# Patient Record
Sex: FEMALE | Race: WHITE | NOT HISPANIC OR LATINO | Employment: PART TIME | URBAN - METROPOLITAN AREA
[De-identification: names, ages, dates, MRNs, and addresses within clinical notes are randomized per-mention and may not be internally consistent; named-entity substitution may affect disease eponyms.]

---
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
## 2021-01-22 ENCOUNTER — TRANSFERRED RECORDS (OUTPATIENT)
Dept: HEALTH INFORMATION MANAGEMENT | Facility: CLINIC | Age: 21
End: 2021-01-22

## 2023-04-24 ASSESSMENT — PATIENT HEALTH QUESTIONNAIRE - PHQ9
10. IF YOU CHECKED OFF ANY PROBLEMS, HOW DIFFICULT HAVE THESE PROBLEMS MADE IT FOR YOU TO DO YOUR WORK, TAKE CARE OF THINGS AT HOME, OR GET ALONG WITH OTHER PEOPLE: NOT DIFFICULT AT ALL
SUM OF ALL RESPONSES TO PHQ QUESTIONS 1-9: 10
SUM OF ALL RESPONSES TO PHQ QUESTIONS 1-9: 10

## 2023-04-24 ASSESSMENT — ENCOUNTER SYMPTOMS
PALPITATIONS: 0
FREQUENCY: 0
ARTHRALGIAS: 0
MYALGIAS: 0
HEMATOCHEZIA: 0
PARESTHESIAS: 0
FEVER: 0
HEADACHES: 1
CHILLS: 0
EYE PAIN: 0
NERVOUS/ANXIOUS: 1
ABDOMINAL PAIN: 0
SHORTNESS OF BREATH: 0
NAUSEA: 0
HEARTBURN: 0
BREAST MASS: 0
SORE THROAT: 0
CONSTIPATION: 0
DYSURIA: 0
DIZZINESS: 0
WEAKNESS: 0
HEMATURIA: 0
DIARRHEA: 0
JOINT SWELLING: 0
COUGH: 0

## 2023-04-27 ENCOUNTER — OFFICE VISIT (OUTPATIENT)
Dept: FAMILY MEDICINE | Facility: CLINIC | Age: 23
End: 2023-04-27
Payer: COMMERCIAL

## 2023-04-27 VITALS
SYSTOLIC BLOOD PRESSURE: 118 MMHG | TEMPERATURE: 98.2 F | OXYGEN SATURATION: 98 % | BODY MASS INDEX: 23.95 KG/M2 | HEART RATE: 75 BPM | RESPIRATION RATE: 16 BRPM | WEIGHT: 158 LBS | HEIGHT: 68 IN | DIASTOLIC BLOOD PRESSURE: 76 MMHG

## 2023-04-27 DIAGNOSIS — Z12.4 CERVICAL CANCER SCREENING: ICD-10-CM

## 2023-04-27 DIAGNOSIS — L20.82 FLEXURAL ECZEMA: ICD-10-CM

## 2023-04-27 DIAGNOSIS — Z00.00 ROUTINE GENERAL MEDICAL EXAMINATION AT A HEALTH CARE FACILITY: Primary | ICD-10-CM

## 2023-04-27 DIAGNOSIS — F41.9 ANXIETY: ICD-10-CM

## 2023-04-27 DIAGNOSIS — R41.840 DIFFICULTY CONCENTRATING: ICD-10-CM

## 2023-04-27 PROBLEM — S06.0X0A CONCUSSION WITHOUT LOSS OF CONSCIOUSNESS: Status: ACTIVE | Noted: 2023-04-27

## 2023-04-27 PROBLEM — F33.0 MILD RECURRENT MAJOR DEPRESSION (H): Status: ACTIVE | Noted: 2023-04-27

## 2023-04-27 PROCEDURE — 90715 TDAP VACCINE 7 YRS/> IM: CPT | Performed by: PHYSICIAN ASSISTANT

## 2023-04-27 PROCEDURE — 99213 OFFICE O/P EST LOW 20 MIN: CPT | Mod: 25 | Performed by: PHYSICIAN ASSISTANT

## 2023-04-27 PROCEDURE — 90471 IMMUNIZATION ADMIN: CPT | Performed by: PHYSICIAN ASSISTANT

## 2023-04-27 PROCEDURE — G0145 SCR C/V CYTO,THINLAYER,RESCR: HCPCS | Performed by: PHYSICIAN ASSISTANT

## 2023-04-27 PROCEDURE — 99385 PREV VISIT NEW AGE 18-39: CPT | Mod: 25 | Performed by: PHYSICIAN ASSISTANT

## 2023-04-27 RX ORDER — HYDROXYZINE HYDROCHLORIDE 25 MG/1
TABLET, FILM COATED ORAL
COMMUNITY
Start: 2022-11-14 | End: 2023-04-27 | Stop reason: SINTOL

## 2023-04-27 RX ORDER — SERTRALINE HYDROCHLORIDE 25 MG/1
TABLET, FILM COATED ORAL
COMMUNITY
Start: 2022-11-14 | End: 2023-04-27

## 2023-04-27 ASSESSMENT — ENCOUNTER SYMPTOMS
SHORTNESS OF BREATH: 0
DIARRHEA: 0
MYALGIAS: 0
ARTHRALGIAS: 0
CHILLS: 0
HEMATOCHEZIA: 0
SORE THROAT: 0
BREAST MASS: 0
WEAKNESS: 0
FEVER: 0
HEADACHES: 1
HEARTBURN: 0
JOINT SWELLING: 0
ABDOMINAL PAIN: 0
HEMATURIA: 0
DIZZINESS: 0
NERVOUS/ANXIOUS: 1
FREQUENCY: 0
DYSURIA: 0
CONSTIPATION: 0
PALPITATIONS: 0
COUGH: 0
PARESTHESIAS: 0
EYE PAIN: 0
NAUSEA: 0

## 2023-04-27 ASSESSMENT — ANXIETY QUESTIONNAIRES
IF YOU CHECKED OFF ANY PROBLEMS ON THIS QUESTIONNAIRE, HOW DIFFICULT HAVE THESE PROBLEMS MADE IT FOR YOU TO DO YOUR WORK, TAKE CARE OF THINGS AT HOME, OR GET ALONG WITH OTHER PEOPLE: SOMEWHAT DIFFICULT
2. NOT BEING ABLE TO STOP OR CONTROL WORRYING: MORE THAN HALF THE DAYS
GAD7 TOTAL SCORE: 12
5. BEING SO RESTLESS THAT IT IS HARD TO SIT STILL: MORE THAN HALF THE DAYS
6. BECOMING EASILY ANNOYED OR IRRITABLE: NEARLY EVERY DAY
GAD7 TOTAL SCORE: 12
3. WORRYING TOO MUCH ABOUT DIFFERENT THINGS: SEVERAL DAYS
1. FEELING NERVOUS, ANXIOUS, OR ON EDGE: MORE THAN HALF THE DAYS
7. FEELING AFRAID AS IF SOMETHING AWFUL MIGHT HAPPEN: NOT AT ALL

## 2023-04-27 ASSESSMENT — PATIENT HEALTH QUESTIONNAIRE - PHQ9: 5. POOR APPETITE OR OVEREATING: MORE THAN HALF THE DAYS

## 2023-04-27 NOTE — PATIENT INSTRUCTIONS
Eczema: I recommend using a daily moisturizing cream (such as Cetaphil or CeraVe) at least 4-5 times.     Use hydrocortisone as needed (twice per day for 3-7 days at a time).     Preventive Health Recommendations  Female Ages 21 to 25     Yearly exam:   See your health care provider every year in order to  Review health changes.   Discuss preventive care.    Review your medicines if your doctor has prescribed any.    You should be tested each year for STDs (sexually transmitted diseases).     Talk to your provider about how often you should have cholesterol testing.    Get a Pap test every three years. If you have an abnormal result, your doctor may have you test more often.    If you are at risk for diabetes, you should have a diabetes test (fasting glucose).     Shots:   Get a flu shot each year.   Get a tetanus shot every 10 years.   Consider getting the shot (vaccine) that prevents cervical cancer (Gardasil).    Nutrition:   Eat at least 5 servings of fruits and vegetables each day.  Eat whole-grain bread, whole-wheat pasta and brown rice instead of white grains and rice.  Get adequate Calcium and Vitamin D.     Lifestyle  Exercise at least 150 minutes a week each week (30 minutes a day, 5 days a week). This will help you control your weight and prevent disease.  Limit alcohol to one drink per day.  No smoking.   Wear sunscreen to prevent skin cancer.  See your dentist every six months for an exam and cleaning.

## 2023-04-27 NOTE — PROGRESS NOTES
Prior to immunization administration, verified patients identity using patient s name and date of birth. Please see Immunization Activity for additional information.     Screening Questionnaire for Adult Immunization    Are you sick today?   No   Do you have allergies to medications, food, a vaccine component or latex?   No   Have you ever had a serious reaction after receiving a vaccination?   No   Do you have a long-term health problem with heart, lung, kidney, or metabolic disease (e.g., diabetes), asthma, a blood disorder, no spleen, complement component deficiency, a cochlear implant, or a spinal fluid leak?  Are you on long-term aspirin therapy?   No   Do you have cancer, leukemia, HIV/AIDS, or any other immune system problem?   No   Do you have a parent, brother, or sister with an immune system problem?   No   In the past 3 months, have you taken medications that affect  your immune system, such as prednisone, other steroids, or anticancer drugs; drugs for the treatment of rheumatoid arthritis, Crohn s disease, or psoriasis; or have you had radiation treatments?   No   Have you had a seizure, or a brain or other nervous system problem?   No   During the past year, have you received a transfusion of blood or blood    products, or been given immune (gamma) globulin or antiviral drug?   No   For women: Are you pregnant or is there a chance you could become       pregnant during the next month?   No   Have you received any vaccinations in the past 4 weeks?   No     Immunization questionnaire answers were all negative.      Injection of Tdap given by Jerardo Martínez CMA. Patient instructed to remain in clinic for 15 minutes afterwards, and to report any adverse reactions.     Screening performed by Jerardo Martínez CMA on 4/27/2023 at 11:06 AM.       SUBJECTIVE:   CC: Dustin is an 22 year old who presents for preventive health visit.        View : No data to display.            Patient has been advised of split  billing requirements and indicates understanding: Yes  Healthy Habits:     Getting at least 3 servings of Calcium per day:  NO    Bi-annual eye exam:  NO    Dental care twice a year:  NO    Sleep apnea or symptoms of sleep apnea:  None    Diet:  Regular (no restrictions)    Frequency of exercise:  1 day/week    Duration of exercise:  Less than 15 minutes    Taking medications regularly:  Yes    Medication side effects:  None    PHQ-2 Total Score: 4    Additional concerns today:  Yes    New patient arrived for Annual Physical. Due for PAP. Pt is fasting for lab work. No questions outside of Physical per pt.     She would like further testing for possible ADHD.  Since her Freshman year of college she noticed difficulty with school.   She does notice she will interrupt people during conversations frequently.  She has difficulty sitting still.  Will fidget often.  Keeping concentration is difficulty.   Graduating in a week with a degree in Criminal justice and Psychology.    Works at [employer].      If she takes hydroxyzine, she wakes up the next day with migraine symptoms.   She takes the hydroxyzine to help her sleep as she feels her mind is racing and anxiety levels are high before bed.   A co-worker mentioned trazodone as a different medication she could try.     Sertraline has helped with anxiety and she feels more mellow.  Started this October 2022.         She c/o skin rash in antecubital areas.  Comes out of no where.  No triggers noted.  Tried switching to a dye free detergent.  Will also come on over shins.  Rash is itchy.   Tried sensitive skin lotion. Cortizone 10 does help.                  Today's PHQ-2 Score:       4/24/2023     4:59 PM   PHQ-2 ( 1999 Pfizer)   Q1: Little interest or pleasure in doing things 2   Q2: Feeling down, depressed or hopeless 2   PHQ-2 Score 4   Q1: Little interest or pleasure in doing things More than half the days   Q2: Feeling down, depressed or hopeless  More than half the days   PHQ-2 Score 4       Have you ever done Advance Care Planning? (For example, a Health Directive, POLST, or a discussion with a medical provider or your loved ones about your wishes):     Social History     Tobacco Use     Smoking status: Never     Smokeless tobacco: Never   Vaping Use     Vaping status: Never Used   Substance Use Topics     Alcohol use: Yes     Comment: social             4/24/2023     4:59 PM   Alcohol Use   Prescreen: >3 drinks/day or >7 drinks/week? No     Reviewed orders with patient.  Reviewed health maintenance and updated orders accordingly - Yes  Labs reviewed in EPIC  BP Readings from Last 3 Encounters:   04/27/23 118/76    Wt Readings from Last 3 Encounters:   04/27/23 71.7 kg (158 lb)   04/26/17 55.3 kg (122 lb) (53 %, Z= 0.08)*   04/20/17 55.7 kg (122 lb 12.8 oz) (55 %, Z= 0.12)*     * Growth percentiles are based on CDC (Girls, 2-20 Years) data.                  Patient Active Problem List   Diagnosis     Concussion without loss of consciousness     Mild recurrent major depression (H)     Anxiety     Flexural eczema     History reviewed. No pertinent surgical history.    Social History     Tobacco Use     Smoking status: Never     Smokeless tobacco: Never   Vaping Use     Vaping status: Never Used   Substance Use Topics     Alcohol use: Yes     Comment: social     Family History   Problem Relation Age of Onset     Breast Cancer Maternal Grandmother            Breast Cancer Screening:        History of abnormal Pap smear: NO - age 21-29 PAP every 3 years recommended     Reviewed and updated as needed this visit by clinical staff   Tobacco  Allergies  Meds  Problems  Med Hx  Surg Hx  Fam Hx  Soc   Hx        Reviewed and updated as needed this visit by Provider   Tobacco  Allergies  Meds  Problems  Med Hx  Surg Hx  Fam Hx  Soc   Hx           Review of Systems   Constitutional: Negative for chills and fever.   HENT: Negative for congestion, ear pain,  "hearing loss and sore throat.    Eyes: Negative for pain and visual disturbance.   Respiratory: Negative for cough and shortness of breath.    Cardiovascular: Negative for chest pain, palpitations and peripheral edema.   Gastrointestinal: Negative for abdominal pain, constipation, diarrhea, heartburn, hematochezia and nausea.   Breasts:  Negative for tenderness, breast mass and discharge.   Genitourinary: Negative for dysuria, frequency, genital sores, hematuria, pelvic pain, urgency, vaginal bleeding and vaginal discharge.   Musculoskeletal: Negative for arthralgias, joint swelling and myalgias.   Skin: Negative for rash.   Neurological: Positive for headaches. Negative for dizziness, weakness and paresthesias.   Psychiatric/Behavioral: Positive for mood changes. The patient is nervous/anxious.      CONSTITUTIONAL: NEGATIVE for fever, chills, change in weight  INTEGUMENTARU/SKIN: NEGATIVE for worrisome rashes, moles or lesions  EYES: NEGATIVE for vision changes or irritation  ENT: NEGATIVE for ear, mouth and throat problems  RESP: NEGATIVE for significant cough or SOB  BREAST: NEGATIVE for masses, tenderness or discharge  CV: NEGATIVE for chest pain, palpitations or peripheral edema  GI: NEGATIVE for nausea, abdominal pain, heartburn, or change in bowel habits  : NEGATIVE for unusual urinary or vaginal symptoms. Periods are regular.  MUSCULOSKELETAL: NEGATIVE for significant arthralgias or myalgia  NEURO: NEGATIVE for weakness, dizziness or paresthesias  PSYCHIATRIC: NEGATIVE for changes in mood or affect     OBJECTIVE:   /76 (BP Location: Left arm, Patient Position: Chair, Cuff Size: Adult Regular)   Pulse 75   Temp 98.2  F (36.8  C) (Tympanic)   Resp 16   Ht 1.715 m (5' 7.52\")   Wt 71.7 kg (158 lb)   LMP 03/21/2023 (Approximate)   SpO2 98%   BMI 24.37 kg/m    Physical Exam  GENERAL: healthy, alert and no distress  EYES: Eyes grossly normal to inspection, PERRL and conjunctivae and sclerae " normal  HENT: ear canals and TM's normal, nose and mouth without ulcers or lesions  NECK: no adenopathy, no asymmetry, masses, or scars and thyroid normal to palpation  RESP: lungs clear to auscultation - no rales, rhonchi or wheezes  BREAST: normal without masses, tenderness or nipple discharge and no palpable axillary masses or adenopathy  CV: regular rate and rhythm, normal S1 S2, no S3 or S4, no murmur, click or rub, no peripheral edema and peripheral pulses strong  ABDOMEN: soft, nontender, no hepatosplenomegaly, no masses and bowel sounds normal   (female): normal female external genitalia, normal urethral meatus, vaginal mucosa pink, moist, well rugated, and normal cervix/adnexa/uterus without masses or discharge  MS: no gross musculoskeletal defects noted, no edema  SKIN: erythematous, papular/scaly rash noted on right antecubital area  NEURO: Normal strength and tone, mentation intact and speech normal  PSYCH: mentation appears normal, affect normal/bright    Diagnostic Test Results:  Labs reviewed in Epic    ASSESSMENT/PLAN:   (Z00.00) Routine general medical examination at a health care facility  (primary encounter diagnosis)  Comment:      HEALTH CARE MAINTENANCE              Reviewed USPTF recommendations and anticipatory guidance.              See orders.   I discussed in detail with Dustin Sanchez the importance of cervical cancer screenings through pap smears, will repeat pap every 3 year(s).      Tdap given today.     (Z12.4) Cervical cancer screening  Comment: This was her first pap smear.  I took extra time today showing her what this test involves (using patient handout diagram) and why it is important to be screened on a regular basis.  I gave her a handout on pap smears and answered all her questions.      Plan: Pap Screen only - recommended age 21 - 24 years          Given this was her first pap, she did have discomfort.  Was able to obtain cervical sample (not able to get endocervical  "cells today). If pap normal, ok to repeat in 3 years (she is low risk and up to date on HPV).     (R41.840) Difficulty concentrating  Comment: referral for ADHD testing placed.   Plan: Adult Mental Health  Referral            (F41.9) Anxiety  Comment: We discussed either increasing zoloft from 25 mg to 50 mg daily or adding trazodone (25-50 mg).  She prefers to start with increasing the zoloft and may reach out if she would like a script for trazodone.   Plan: sertraline (ZOLOFT) 50 MG tablet            (L20.82) Flexural eczema      Will start a steroid cream today, see epic for orders.  Continue to use lubricating cream, especially after bath to trap moisture in skin.  May use anti-histamine for pruritus, which may cause drowsiness.  Patient education materials given during examination today, \"eczema\".   Patient is to f/u if rash appears infected or worsens over the next few days.              Plan:     Patient has been advised of split billing requirements and indicates understanding: Yes      COUNSELING:  Reviewed preventive health counseling, as reflected in patient instructions       Regular exercise       Healthy diet/nutrition        She reports that she has never smoked. She has never used smokeless tobacco.      Licha Cordova PA-C  [facility]  Answers for HPI/ROS submitted by the patient on 4/24/2023  If you checked off any problems, how difficult have these problems made it for you to do your work, take care of things at home, or get along with other people?: Not difficult at all  PHQ9 TOTAL SCORE: 10      "

## 2023-05-01 LAB
BKR LAB AP GYN ADEQUACY: NORMAL
BKR LAB AP GYN INTERPRETATION: NORMAL
BKR LAB AP HPV REFLEX: NO
BKR LAB AP PREVIOUS ABNORMAL: NORMAL
PATH REPORT.COMMENTS IMP SPEC: NORMAL
PATH REPORT.COMMENTS IMP SPEC: NORMAL
PATH REPORT.RELEVANT HX SPEC: NORMAL

## 2023-08-03 ENCOUNTER — E-VISIT (OUTPATIENT)
Dept: FAMILY MEDICINE | Facility: CLINIC | Age: 23
End: 2023-08-03
Payer: COMMERCIAL

## 2023-08-03 DIAGNOSIS — G43.001 MIGRAINE WITHOUT AURA AND WITH STATUS MIGRAINOSUS, NOT INTRACTABLE: Primary | ICD-10-CM

## 2023-08-03 PROCEDURE — 99421 OL DIG E/M SVC 5-10 MIN: CPT | Performed by: NURSE PRACTITIONER

## 2023-08-03 RX ORDER — SUMATRIPTAN 25 MG/1
25-50 TABLET, FILM COATED ORAL
Qty: 20 TABLET | Refills: 3 | Status: SHIPPED | OUTPATIENT
Start: 2023-08-03 | End: 2024-07-19

## 2023-10-03 ENCOUNTER — E-VISIT (OUTPATIENT)
Dept: FAMILY MEDICINE | Facility: CLINIC | Age: 23
End: 2023-10-03
Payer: COMMERCIAL

## 2023-10-03 DIAGNOSIS — R19.7 DIARRHEA, UNSPECIFIED TYPE: Primary | ICD-10-CM

## 2023-10-03 PROCEDURE — 99421 OL DIG E/M SVC 5-10 MIN: CPT | Performed by: PHYSICIAN ASSISTANT

## 2023-10-16 DIAGNOSIS — F41.9 ANXIETY: ICD-10-CM

## 2023-10-16 ASSESSMENT — ANXIETY QUESTIONNAIRES
GAD7 TOTAL SCORE: 13
3. WORRYING TOO MUCH ABOUT DIFFERENT THINGS: SEVERAL DAYS
2. NOT BEING ABLE TO STOP OR CONTROL WORRYING: SEVERAL DAYS
7. FEELING AFRAID AS IF SOMETHING AWFUL MIGHT HAPPEN: NOT AT ALL
4. TROUBLE RELAXING: NEARLY EVERY DAY
1. FEELING NERVOUS, ANXIOUS, OR ON EDGE: MORE THAN HALF THE DAYS
GAD7 TOTAL SCORE: 13
6. BECOMING EASILY ANNOYED OR IRRITABLE: NEARLY EVERY DAY
IF YOU CHECKED OFF ANY PROBLEMS ON THIS QUESTIONNAIRE, HOW DIFFICULT HAVE THESE PROBLEMS MADE IT FOR YOU TO DO YOUR WORK, TAKE CARE OF THINGS AT HOME, OR GET ALONG WITH OTHER PEOPLE: VERY DIFFICULT
5. BEING SO RESTLESS THAT IT IS HARD TO SIT STILL: NEARLY EVERY DAY

## 2023-10-18 ENCOUNTER — VIRTUAL VISIT (OUTPATIENT)
Dept: PSYCHOLOGY | Facility: CLINIC | Age: 23
End: 2023-10-18
Payer: COMMERCIAL

## 2023-10-18 DIAGNOSIS — R41.840 DIFFICULTY CONCENTRATING: ICD-10-CM

## 2023-10-18 PROCEDURE — 90834 PSYTX W PT 45 MINUTES: CPT | Mod: 95 | Performed by: PSYCHOLOGIST

## 2023-10-18 ASSESSMENT — PATIENT HEALTH QUESTIONNAIRE - PHQ9
10. IF YOU CHECKED OFF ANY PROBLEMS, HOW DIFFICULT HAVE THESE PROBLEMS MADE IT FOR YOU TO DO YOUR WORK, TAKE CARE OF THINGS AT HOME, OR GET ALONG WITH OTHER PEOPLE: VERY DIFFICULT
SUM OF ALL RESPONSES TO PHQ QUESTIONS 1-9: 10
SUM OF ALL RESPONSES TO PHQ QUESTIONS 1-9: 10
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
## 2023-10-25 ENCOUNTER — VIRTUAL VISIT (OUTPATIENT)
Dept: PSYCHOLOGY | Facility: CLINIC | Age: 23
End: 2023-10-25
Payer: COMMERCIAL

## 2023-10-25 DIAGNOSIS — F90.2 ADHD (ATTENTION DEFICIT HYPERACTIVITY DISORDER), COMBINED TYPE: Primary | ICD-10-CM

## 2023-10-25 PROCEDURE — 90791 PSYCH DIAGNOSTIC EVALUATION: CPT | Mod: 95 | Performed by: PSYCHOLOGIST

## 2023-10-25 ASSESSMENT — PATIENT HEALTH QUESTIONNAIRE - PHQ9
10. IF YOU CHECKED OFF ANY PROBLEMS, HOW DIFFICULT HAVE THESE PROBLEMS MADE IT FOR YOU TO DO YOUR WORK, TAKE CARE OF THINGS AT HOME, OR GET ALONG WITH OTHER PEOPLE: SOMEWHAT DIFFICULT
SUM OF ALL RESPONSES TO PHQ QUESTIONS 1-9: 9
SUM OF ALL RESPONSES TO PHQ QUESTIONS 1-9: 9
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
## 2023-10-30 ASSESSMENT — COLUMBIA-SUICIDE SEVERITY RATING SCALE - C-SSRS
6. HAVE YOU EVER DONE ANYTHING, STARTED TO DO ANYTHING, OR PREPARED TO DO ANYTHING TO END YOUR LIFE?: NO
1. IN THE PAST MONTH, HAVE YOU WISHED YOU WERE DEAD OR WISHED YOU COULD GO TO SLEEP AND NOT WAKE UP?: NO
2. HAVE YOU ACTUALLY HAD ANY THOUGHTS OF KILLING YOURSELF IN THE PAST MONTH?: NO

## 2023-11-24 ENCOUNTER — VIRTUAL VISIT (OUTPATIENT)
Dept: PSYCHOLOGY | Facility: CLINIC | Age: 23
End: 2023-11-24
Payer: COMMERCIAL

## 2023-11-24 DIAGNOSIS — F90.2 ADHD (ATTENTION DEFICIT HYPERACTIVITY DISORDER), COMBINED TYPE: Primary | ICD-10-CM

## 2023-11-24 DIAGNOSIS — R41.840 DIFFICULTY CONCENTRATING: ICD-10-CM

## 2023-11-24 PROCEDURE — 90834 PSYTX W PT 45 MINUTES: CPT | Mod: 95 | Performed by: PSYCHOLOGIST

## 2023-11-24 PROCEDURE — 96131 PSYCL TST EVAL PHYS/QHP EA: CPT | Mod: 95 | Performed by: PSYCHOLOGIST

## 2023-11-24 PROCEDURE — 96130 PSYCL TST EVAL PHYS/QHP 1ST: CPT | Mod: 95 | Performed by: PSYCHOLOGIST
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
## 2023-11-24 NOTE — PROGRESS NOTES
Moderate combined, RO concussion syndrome, rec Neuropsych testing.                                               Progress Note  Disclaimer: Voice recognition software was used to generate this note. As a result, wrong word or 'sound-a-like' substitutions may have occurred due to the inherent limitations of voice recognition software. There may be errors in the script that have gone undetected. Please consider this when interpreting information found in this chart.       Client Name: Dustin [facility]IP [facility]  Date: 11/24/2023      Service Type: Individual      Session Start Time: 11:00 AM  session End Time: 11:45 AM     Session Length: 45    Session #: 3    Attendees: Client attended alone    Service Modality:  Video Visit:      Provider verified identity through the following two step process.  Patient provided:  Patient is known previously to provider    Telemedicine Visit: The patient's condition can be safely assessed and treated via synchronous audio and visual telemedicine encounter.      Reason for Telemedicine Visit: Services only offered telehealth    Originating Site (Patient Location): Patient's home    Distant Site (Provider Location): Provider Remote Setting- Home Office    Consent:  The patient/guardian has verbally consented to: the potential risks and benefits of telemedicine (video visit) versus in person care; bill my insurance or make self-payment for services provided; and responsibility for payment of non-covered services.     Patient would like the video invitation sent by:  My Chart    Mode of Communication:  Video Conference via [facility]    Distant Location (Provider):  Off-site    As the provider I attest to compliance with applicable laws and regulations related to telemedicine.     Treatment Plan Last Reviewed: Today  PHQ-9 / FLORENTINO-7 : See Flowsheets    DATA  Interactive Complexity: NO  Crisis: NO            DATA   ADHD Assessment feedback session. Reviewed results of testing. See [facility] extended  documentation for results.  There is a high likelihood that this client is experiencing postconcussion syndrome and I recommended neuropsych testing be completed.  Explained diagnosis and treatment options. Recommended medication evaluation be scheduled with PCP. Also provided and reviewed ADHD patient handout. Pt will schedule follow-up with me after seeing PCP.     Progress Since Last Session (Related to Symptoms / Goals / Homework):   Symptoms: No change stable    Homework: Achieved / completed to satisfaction      Episode of Care Goals: Satisfactory progress - ACTION (Actively working towards change); Intervened by reinforcing change plan / affirming steps taken     Current / Ongoing Stressors and Concerns:   Difficulty with attention and concentration     Treatment Objective(s) Addressed in This Session:   Reviewed results of testing, provided ADHD Psychoeducation tips and resources, encouraged client to make appointment for medication evaluation.       Intervention:   psychoeducation regarding ADHD        ASSESSMENT: Current Emotional / Mental Status (status of significant symptoms):   Risk status (Self / Other harm or suicidal ideation)   Client denies current fears or concerns for personal safety.   Client denies current or recent suicidal ideation or behaviors.   Client denies current or recent homicidal ideation or behaviors.   Client denies current or recent self injurious behavior or ideation.   Client denies other safety concerns.   Recommended that patient call 911 or go to the local ED should there be a change in any of these risk factors.     Appearance:   Appropriate    Eye Contact:   Good    Psychomotor Behavior: Normal    Attitude:   Cooperative    Orientation:   All   Speech    Rate / Production: Normal     Volume:  Normal    Mood:    Normal   Affect:    Appropriate    Thought Content:  Clear    Thought Form:  Coherent  Logical    Insight:    Good      Medication Review:   No changes to current  psychiatric medication(s)     Medication Compliance:   Yes     Changes in Health Issues:   None reported     Chemical Use Review:   Substance Use: Chemical use reviewed, no active concerns identified      Tobacco Use: No current tobacco use.       Collateral Reports Completed:   Not Applicable    PLAN: (Client Tasks / Therapist Tasks / Other)  See [facility] Extended Documentation for testing report.  Client to pursue neuropsych testing.        Clarence Narvaez          Psychological Testing   Billing/Services Summary       Initial interview/Record Review 10/18/2023 91165     Intake 10/25/2023 58252     Interactive feedback Session 11/24/2023 35508     Testing Evaluation Services Base: 12786  (1st 60 mins) Add-on: 32297  (each addtl 60 mins)   Test Scoring and Interpretation    60 minutes   Integration/Report Generation     120 minutes   Clinical Decision Making/Battery Modification     0 minutes   Post-Service Work     0 minutes   Total Time: 180 minutes (3 hours, 00 minutes)   Total Units:                 Diagnosis(es): (ICD-10) F90.2 ADHD combined

## 2023-12-05 ASSESSMENT — PATIENT HEALTH QUESTIONNAIRE - PHQ9
SUM OF ALL RESPONSES TO PHQ QUESTIONS 1-9: 14
SUM OF ALL RESPONSES TO PHQ QUESTIONS 1-9: 14
10. IF YOU CHECKED OFF ANY PROBLEMS, HOW DIFFICULT HAVE THESE PROBLEMS MADE IT FOR YOU TO DO YOUR WORK, TAKE CARE OF THINGS AT HOME, OR GET ALONG WITH OTHER PEOPLE: VERY DIFFICULT

## 2023-12-06 ENCOUNTER — VIRTUAL VISIT (OUTPATIENT)
Dept: FAMILY MEDICINE | Facility: CLINIC | Age: 23
End: 2023-12-06
Payer: COMMERCIAL

## 2023-12-06 ENCOUNTER — MYC MEDICAL ADVICE (OUTPATIENT)
Dept: FAMILY MEDICINE | Facility: CLINIC | Age: 23
End: 2023-12-06

## 2023-12-06 ENCOUNTER — TELEPHONE (OUTPATIENT)
Dept: FAMILY MEDICINE | Facility: CLINIC | Age: 23
End: 2023-12-06

## 2023-12-06 DIAGNOSIS — F90.2 ADHD (ATTENTION DEFICIT HYPERACTIVITY DISORDER), COMBINED TYPE: Primary | ICD-10-CM

## 2023-12-06 PROCEDURE — 99214 OFFICE O/P EST MOD 30 MIN: CPT | Mod: VID | Performed by: PHYSICIAN ASSISTANT

## 2023-12-06 RX ORDER — DEXTROAMPHETAMINE SACCHARATE, AMPHETAMINE ASPARTATE, DEXTROAMPHETAMINE SULFATE AND AMPHETAMINE SULFATE 1.25; 1.25; 1.25; 1.25 MG/1; MG/1; MG/1; MG/1
5 TABLET ORAL 2 TIMES DAILY
Qty: 60 TABLET | Refills: 0 | Status: SHIPPED | OUTPATIENT
Start: 2023-12-06 | End: 2024-01-08

## 2023-12-06 NOTE — TELEPHONE ENCOUNTER
Questionnaire completed and answers added to upcoming appointment, closing encounter.     Kirstin White MA on 12/6/2023 at 9:11 AM

## 2023-12-06 NOTE — TELEPHONE ENCOUNTER
Sent ADHD Questionnaire through [employer] for completion prior to virtual visit with Licha Cordova today 12/06/23.     Kirstin White MA on 12/6/2023 at 7:30 AM

## 2023-12-06 NOTE — PROGRESS NOTES
Dustin is a 23 year old who is being evaluated via a billable video visit.      How would you like to obtain your AVS? MyChart  If the video visit is dropped, the invitation should be resent by: Text to cell phone: [phone]  Will anyone else be joining your video visit? No          Assessment & Plan     ADHD (attention deficit hyperactivity disorder), combined type  Discussed new diagnosis of ADHD and treatment options.      Reviewed stimulant medication, including potential side effects.  Discussed these are controlled substances, therefore cannot replace lost or stolen script.  Annual controlled substance agreement and urine drug screen expected while on controlled substances.   Answered questions and reviewed typical follow-up schedule.     - amphetamine-dextroamphetamine (ADDERALL) 5 MG tablet; Take 1 tablet (5 mg) by mouth 2 times daily      18 minutes spent by me on the date of the encounter doing chart review, history and exam, documentation and further activities per the note       FUTURE APPOINTMENTS:       - Follow-up visit in 1 month (in clinic, will sign CSA, which we discussed today, and UDS).     Licha Cordova PA-C  [facility] LAVELL Chan is a 23 year old, presenting for the following health issues:  Recheck Medication and A.D.H.D        4/27/2023    10:21 AM   Additional Questions   Roomed by Kirstin   Accompanied by Self     Patient arrived to discuss treatment options after recent diagnosis of ADHD, ADHD questionnaire completed.     History of Present Illness       Reason for visit:  ADHD medication  Symptom onset:  More than a month  Symptoms include:  Was just diagnosed with it  Symptom intensity:  Moderate  Symptom progression:  Staying the same  Had these symptoms before:  Yes  Has tried/received treatment for these symptoms:  No    She eats 0-1 servings of fruits and vegetables daily.She consumes 3 sweetened beverage(s) daily.She exercises with enough  effort to increase her heart rate 20 to 29 minutes per day.  She exercises with enough effort to increase her heart rate 3 or less days per week.   She is not taking prescribed medications regularly due to remembering to take.     Please answer the questions below, rating yourself on each of the criteria shown using the scale on the right side of the page. As you answer each question, place an X in the box that best describes how you have felt and conducted yourself over the past 6 months.     Never Rarely Sometimes Often Very Often   1 How often do you have trouble wrapping up the final details of a project once the challenging parts have been done?   x     2 How often do you have difficulty getting things in order when you have to do a task that requires organization?    x    3 How often do you have problems remembering appointments or obligations?  x      4 When you have a task that requires a lot of thought, how often do you avoid or delay getting started?    x    5 How often do you fidget or squirm with your hands or feet when you have to sit down for a long time?     x   6 How often do you feel overly active and compelled to do things, like you were driven by a motor?   x     7 How often do you make careless mistakes when you have to work on a boring or difficult project?   x     8 How often do you have difficulty keeping your attention when you are doing boring or repetitive work?    x    9 How often do you have difficulty concentrating on what people say to you, even when they are speaking to you directly?   x     10 How often do you misplace or have difficulty finding things at home or at work?   x     11 How often are you distracted by activity or noise around you?   x     12 How often do you leave your seat in meetings or other situations in which you are expected to remain seated? x       13 How often do you feel restless or fidgety?   x     14 How often do you have difficulty unwinding and relaxing when  you have time to yourself?    x    15 How often do you find yourself talking too much when you are in social situations?   x     16 When you're in a conversation, how often do you find yourself finishing the sentences of the people you are talking to, before they can finish them themselves?   x     17 How often do you have difficulty waiting your turn in situations when turn-taking is required? x       18 How often do you interrupt others when they are busy?    x         Work is 3-11 pm and then will switch to 7 - 3 pm (works at [employer]).  She has noticed this is affecting her work, she won't stop moving and she is often very scattered with tasks and is all over the place.  She will then sometimes forget things. She will make a list when she is at home but will often get distracted.              Review of Systems   Constitutional, HEENT, cardiovascular, pulmonary, gi and gu systems are negative, except as otherwise noted.      Objective           Vitals:  No vitals were obtained today due to virtual visit.    Physical Exam   GENERAL: Healthy, alert and no distress  EYES: Eyes grossly normal to inspection.  No discharge or erythema, or obvious scleral/conjunctival abnormalities.  RESP: No audible wheeze, cough, or visible cyanosis.  No visible retractions or increased work of breathing.    SKIN: Visible skin clear. No significant rash, abnormal pigmentation or lesions.  NEURO: Cranial nerves grossly intact.  Mentation and speech appropriate for age.  PSYCH: Mentation appears normal, affect normal/bright, judgement and insight intact, normal speech and appearance well-groomed.                Video-Visit Details    Type of service:  Video Visit     Originating Location (pt. Location): Home    Distant Location (provider location):  On-site  Platform used for Video Visit: Siri

## 2023-12-12 ENCOUNTER — FCC EXTENDED DOCUMENTATION (OUTPATIENT)
Dept: PSYCHOLOGY | Facility: CLINIC | Age: 23
End: 2023-12-12
Payer: COMMERCIAL
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
## 2023-12-15 ENCOUNTER — TELEPHONE (OUTPATIENT)
Dept: FAMILY MEDICINE | Facility: CLINIC | Age: 23
End: 2023-12-15
Payer: COMMERCIAL
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
## 2023-12-23 ENCOUNTER — TELEPHONE (OUTPATIENT)
Dept: FAMILY MEDICINE | Facility: CLINIC | Age: 23
End: 2023-12-23
Payer: COMMERCIAL

## 2024-01-03 ENCOUNTER — MYC MEDICAL ADVICE (OUTPATIENT)
Dept: FAMILY MEDICINE | Facility: CLINIC | Age: 24
End: 2024-01-03
Payer: COMMERCIAL

## 2024-01-07 ASSESSMENT — PATIENT HEALTH QUESTIONNAIRE - PHQ9
SUM OF ALL RESPONSES TO PHQ QUESTIONS 1-9: 7
SUM OF ALL RESPONSES TO PHQ QUESTIONS 1-9: 7
10. IF YOU CHECKED OFF ANY PROBLEMS, HOW DIFFICULT HAVE THESE PROBLEMS MADE IT FOR YOU TO DO YOUR WORK, TAKE CARE OF THINGS AT HOME, OR GET ALONG WITH OTHER PEOPLE: SOMEWHAT DIFFICULT

## 2024-01-08 ENCOUNTER — OFFICE VISIT (OUTPATIENT)
Dept: FAMILY MEDICINE | Facility: CLINIC | Age: 24
End: 2024-01-08
Payer: COMMERCIAL

## 2024-01-08 VITALS
OXYGEN SATURATION: 98 % | WEIGHT: 172 LBS | RESPIRATION RATE: 16 BRPM | BODY MASS INDEX: 26.07 KG/M2 | SYSTOLIC BLOOD PRESSURE: 130 MMHG | HEART RATE: 92 BPM | DIASTOLIC BLOOD PRESSURE: 72 MMHG | TEMPERATURE: 98.8 F | HEIGHT: 68 IN

## 2024-01-08 DIAGNOSIS — Z79.899 CONTROLLED SUBSTANCE AGREEMENT SIGNED: ICD-10-CM

## 2024-01-08 DIAGNOSIS — F90.2 ADHD (ATTENTION DEFICIT HYPERACTIVITY DISORDER), COMBINED TYPE: Primary | ICD-10-CM

## 2024-01-08 DIAGNOSIS — S06.0X0S CONCUSSION WITHOUT LOSS OF CONSCIOUSNESS, SEQUELA (H): ICD-10-CM

## 2024-01-08 DIAGNOSIS — B35.4 TINEA CORPORIS: ICD-10-CM

## 2024-01-08 PROCEDURE — 80307 DRUG TEST PRSMV CHEM ANLYZR: CPT | Performed by: PHYSICIAN ASSISTANT

## 2024-01-08 PROCEDURE — 99214 OFFICE O/P EST MOD 30 MIN: CPT | Performed by: PHYSICIAN ASSISTANT

## 2024-01-08 PROCEDURE — G0480 DRUG TEST DEF 1-7 CLASSES: HCPCS | Mod: 90 | Performed by: PHYSICIAN ASSISTANT

## 2024-01-08 RX ORDER — DEXTROAMPHETAMINE SACCHARATE, AMPHETAMINE ASPARTATE, DEXTROAMPHETAMINE SULFATE AND AMPHETAMINE SULFATE 1.25; 1.25; 1.25; 1.25 MG/1; MG/1; MG/1; MG/1
5 TABLET ORAL 2 TIMES DAILY PRN
Qty: 60 TABLET | Refills: 0 | Status: SHIPPED | OUTPATIENT
Start: 2024-03-08 | End: 2024-04-22

## 2024-01-08 RX ORDER — DEXTROAMPHETAMINE SACCHARATE, AMPHETAMINE ASPARTATE, DEXTROAMPHETAMINE SULFATE AND AMPHETAMINE SULFATE 1.25; 1.25; 1.25; 1.25 MG/1; MG/1; MG/1; MG/1
5 TABLET ORAL 2 TIMES DAILY PRN
Qty: 60 TABLET | Refills: 0 | Status: SHIPPED | OUTPATIENT
Start: 2024-02-07 | End: 2024-04-22

## 2024-01-08 RX ORDER — DEXTROAMPHETAMINE SACCHARATE, AMPHETAMINE ASPARTATE, DEXTROAMPHETAMINE SULFATE AND AMPHETAMINE SULFATE 1.25; 1.25; 1.25; 1.25 MG/1; MG/1; MG/1; MG/1
5 TABLET ORAL 2 TIMES DAILY PRN
Qty: 60 TABLET | Refills: 0 | Status: SHIPPED | OUTPATIENT
Start: 2024-01-08 | End: 2024-04-22

## 2024-01-08 RX ORDER — HYDROXYZINE HYDROCHLORIDE 25 MG/1
25 TABLET, FILM COATED ORAL 3 TIMES DAILY PRN
Qty: 30 TABLET | Refills: 1 | Status: SHIPPED | OUTPATIENT
Start: 2024-01-08 | End: 2024-07-19

## 2024-01-08 NOTE — LETTER
[facility]  01/08/24  Patient: Dustin Sanchez  YOB: 2000  Medical Record Number: [id number]                                                                                  Non-Opioid Controlled Substance Agreement    This is an agreement between you and your provider regarding safe and appropriate use of controlled substances prescribed by your care team. Controlled substances are?medicines that can cause physical and mental dependence (abuse).     There are strict laws about having and using these medicines. We here at [facility] are  committed to working with you in your efforts to get better. To support you in this work, we'll help you schedule regular office appointments for medicine refills. If we must cancel or change your appointment for any reason, we'll make sure you have enough medicine to last until your next appointment.     As a Provider, I will:   Listen carefully to your concerns while treating you with respect.   Recommend a treatment plan that I believe is in your best interest and may involve therapies other than medicine.    Talk with you often about the possible benefits and the risk of harm of any medicine that we prescribe for you.  Assess the safety of this medicine and check how well it works.    Provide a plan on how to taper (discontinue or go off) using this medicine if the decision is made to stop its use.      ::  As a Patient, I understand controlled substances:     Are prescribed by my care provider to help me function or work and manage my condition(s).?  Are strong medicines and can cause serious side effects.     Need to be taken exactly as prescribed.?Combining controlled substances with certain medicines or chemicals (such as illegal drugs, alcohol, sedatives, sleeping pills, and benzodiazepines) can be dangerous or even fatal.? If I stop taking my medicines suddenly, I may have severe withdrawal symptoms.     The risks, benefits, and  side effects of these medicine(s) were explained to me. I agree that:    I will take part in other treatments as advised by my care team. This may be psychiatry or counseling, physical therapy, behavioral therapy, group treatment or a referral to specialist.    I will keep all my appointments and understand this is part of the monitoring of controlled substances.?My care team may require an office visit for EVERY controlled substance refill. If I miss appointments or don t follow instructions, my care team may stop my medicine    I will take my medicines as prescribed. I will not change the dose or schedule unless my care team tells me to. There will be no refills if I run out early.      I may be asked to come to the clinic and complete a urine drug test or complete a pill count. If I don t give a urine sample or participate in a pill count, the care team may stop my medicine.    I will only receive controlled substance prescriptions from this clinic. If I am treated by another provider, I will tell them that I am taking controlled substances and that I have a treatment agreement with this provider. I will inform my [facility] care team within one business day if I am given a prescription for any controlled substance by another healthcare provider. My [facility] care team can contact other providers and pharmacists about my use of any medicines.    It is up to me to make sure that I don't run out of my medicines on weekends or holidays.?If my care team is willing to refill my prescription without a visit, I must request refills only during office hours. Refills may take up to 3 business days to process. I will use one pharmacy to fill all my controlled substance prescriptions. I will notify the clinic about any changes to my insurance or medicine availability.    I am responsible for my prescriptions. If the medicine/prescription is lost, stolen or destroyed, it will not be replaced.?I also agree not  to share controlled substance medicines with anyone.     I am aware I should not use any illegal or recreational drugs. I agree not to drink alcohol unless my care team says I can.     If I enroll in the [place] [facility] Cannabis program, I will tell my care team before my next refill.    I will tell my care team right away if I become pregnant, have a new medical problem treated outside of my regular clinic, or have a change in my medicines.     I understand that this medicine can affect my thinking, judgment and reaction time.? Alcohol and drugs affect the brain and body, which can affect the safety of my driving. Being under the influence of alcohol or drugs can affect my decision-making, behaviors, personal safety and the safety of others. Driving while impaired (DWI) can occur if a person is driving, operating or in physical control of a car, motorcycle, boat, snowmobile, ATV, motorbike, off-road vehicle or any other motor vehicle ([place] Statute 169A.20). I understand the risk if I choose to drive or operate any vehicle or machinery.    I understand that if I do not follow any of the conditions above, my prescriptions or treatment may be stopped or changed.   I agree that my provider, clinic care team and pharmacy may work with any city, state or federal law enforcement agency that investigates the misuse, sale or other diversion of my controlled medicine. I will allow my provider to discuss my care with, or share a copy of, this agreement with any other treating provider, pharmacy or emergency room where I receive care.     I have read this agreement and have asked questions about anything I did not understand.    ________________________________________________________  Patient Signature - Dustin YIP Laura     ___________________                   Date     ________________________________________________________  Provider Signature - Licha Cordova PA-C       ___________________                   Date      ________________________________________________________  Witness Signature (required if provider not present while patient signing)          ___________________                   Date
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
## 2024-01-09 LAB
AMPHETAMINES UR QL SCN: ABNORMAL
BARBITURATES UR QL SCN: ABNORMAL
BENZODIAZ UR QL SCN: ABNORMAL
BZE UR QL SCN: ABNORMAL
CANNABINOIDS UR QL SCN: ABNORMAL
FENTANYL UR QL: ABNORMAL
OPIATES UR QL SCN: ABNORMAL
PCP QUAL URINE (ROCHE): ABNORMAL

## 2024-01-13 LAB
AMPHET UR-MCNC: 949 NG/ML
MDA UR-MCNC: <200 NG/ML
MDEA UR-MCNC: <200 NG/ML
MDMA UR-MCNC: <200 NG/ML
METHAMPHET UR-MCNC: <200 NG/ML
PHENTERMINE UR CFM-MCNC: <200 NG/ML

## 2024-01-22 ENCOUNTER — VIRTUAL VISIT (OUTPATIENT)
Dept: PSYCHOLOGY | Facility: CLINIC | Age: 24
End: 2024-01-22
Payer: COMMERCIAL

## 2024-01-22 DIAGNOSIS — F90.2 ADHD (ATTENTION DEFICIT HYPERACTIVITY DISORDER), COMBINED TYPE: Primary | ICD-10-CM

## 2024-01-22 PROCEDURE — 90832 PSYTX W PT 30 MINUTES: CPT | Mod: 95 | Performed by: PSYCHOLOGIST

## 2024-01-22 ASSESSMENT — PATIENT HEALTH QUESTIONNAIRE - PHQ9
SUM OF ALL RESPONSES TO PHQ QUESTIONS 1-9: 6
SUM OF ALL RESPONSES TO PHQ QUESTIONS 1-9: 6
10. IF YOU CHECKED OFF ANY PROBLEMS, HOW DIFFICULT HAVE THESE PROBLEMS MADE IT FOR YOU TO DO YOUR WORK, TAKE CARE OF THINGS AT HOME, OR GET ALONG WITH OTHER PEOPLE: SOMEWHAT DIFFICULT

## 2024-01-22 NOTE — PROGRESS NOTES
Progress Note  Disclaimer: Voice recognition software was used to generate this note. As a result, wrong word or 'sound-a-like' substitutions may have occurred due to the inherent limitations of voice recognition software. There may be errors in the script that have gone undetected. Please consider this when interpreting information found in this chart.       Client Name: Dustin Sanchez  Date: January 22, 2024      Service Type: Individual      Session Start Time: 9:59 AM  Session End Time: 10:30     Session Length: 30    Session #: 4    Attendees: Client attended alone    Service Modality:  Video Visit:      Provider verified identity through the following two step process.  Patient provided:  Patient is known previously to provider    Telemedicine Visit: The patient's condition can be safely assessed and treated via synchronous audio and visual telemedicine encounter.      Reason for Telemedicine Visit: Services only offered telehealth    Originating Site (Patient Location): Patient's home    Distant Site (Provider Location): Provider Remote Setting- Home Office    Consent:  The patient/guardian has verbally consented to: the potential risks and benefits of telemedicine (video visit) versus in person care; bill my insurance or make self-payment for services provided; and responsibility for payment of non-covered services.     Patient would like the video invitation sent by:  My Chart    Mode of Communication:  [facility]    Distant Location (Provider):  Off-site    As the provider I attest to compliance with applicable laws and regulations related to telemedicine.     Treatment Plan Last Reviewed: Today  PHQ-9 / FLORENTINO-7 : See Flowsheets    DATA  Interactive Complexity: NO  Crisis: NO            DATA   ADHD Assessment Follow-up session     Progress Since Last Session (Related to Symptoms / Goals / Homework):   Symptoms: No change stable    Homework:  Achieved / completed to satisfaction      Episode of Care Goals: Satisfactory progress - ACTION (Actively working towards change); Intervened by reinforcing change plan / affirming steps taken     Current / Ongoing Stressors and Concerns:   Reviewed medication response, client's efforts at self-education, changes in work/school and relationships     Treatment Objective(s) Addressed in This Session:   Reviewed results of testing, provided ADHD Psychoeducation tips and resources       Intervention:   psychoeducation regarding ADHD        ASSESSMENT: Current Emotional / Mental Status (status of significant symptoms):   Risk status (Self / Other harm or suicidal ideation)   Client denies current fears or concerns for personal safety.   Client denies current or recent suicidal ideation or behaviors.   Client denies current or recent homicidal ideation or behaviors.   Client denies current or recent self injurious behavior or ideation.   Client denies other safety concerns.   Recommended that patient call 911 or go to the local ED should there be a change in any of these risk factors.     Appearance:   Appropriate    Eye Contact:   Good    Psychomotor Behavior: Normal    Attitude:   Cooperative    Orientation:   All   Speech    Rate / Production: Normal     Volume:  Normal    Mood:    Normal   Affect:    Appropriate    Thought Content:  Clear    Thought Form:  Coherent  Logical    Insight:    Good      Medication Review:   Changes to psychiatric medications, see updated Medication List in EPIC.      Medication Compliance:   Yes     Changes in Health Issues:   Yes: more migraines recently, likely dt stress.      Chemical Use Review:   Substance Use: Chemical use reviewed, no active concerns identified      Tobacco Use: No current tobacco use.       Collateral Reports Completed:   Not Applicable    PLAN: (Client Tasks / Therapist Tasks / Other)  See [facility] Extended Documentation for testing report.         Clarence PRABHAKAR  Brice    ______________________________________________________________________    Individual Treatment Plan    Patient's Name: Dustin Sanchez  YOB: 2000    Date of Creation: January 22, 2024    Date Treatment Plan Last Reviewed/Revised: January 22, 2024      DSM5 Diagnoses: Attention-Deficit/Hyperactivity Disorder  314.01 (F90.2) Combined presentation  Psychosocial / Contextual Factors: history of multiple concussions  PROMIS (reviewed every 90 days):     Referral / Collaboration:  Referral to another professional/service is not indicated at this time..    Anticipated number of session for this episode of care: 0      ADHD Treatment plan:  Education- the Biopsychosocial model of ADHD  Client will be able to describe in general terms what ADHD is and is not  Client will be able to describe how ADHD has affected their life in at least two different areas, such as school or work and Home/relationships  Clients parents/guardians or significant others will be provided information on what ADHD is and the ways it can affect relationships (see ADHD reading list) and be encouraged to be a part of clients treatment team.  Medication  If appropriate, Client will participate in medication evaluation for ADHD symptoms and follow medication recommendations.   Behavior change  Using materials form ADHD reading list or others recommended by the therapist, patient will identify and implement at least three behavior changes, (e.g. filing, use of a day planner, putting keys in the same place every day) which will improve organization.  Comorbid conditions   Client and therapist will asses for comorbid conditions (e.g. anxiety, depression, substance use). and add additional items to treatment plan as needed  Self-care  Client will identify 3 things they can do just for themselves  Client will take time for quiet, reflection, meditation 3 times per week  Client will Learn to set boundaries when appropriate  Client  will Identify 2 individuals they can call on for support, distraction  Behavioral Activation  Client will Identify two forms of exercise/activity and engage in them 3 times per week  Client will Identify 2 things that make them laugh, and engage in these 3 times per week.  Client will Identify 2 Creative activities or hobbies  and engage in them 2 times per week  Client will identify music, movies, books that make them feel good and use them 3 times per week  Assessment of progress  Client will engage in assessment of their progress on a regular basis     Answers submitted by the patient for this visit:  Patient Health Questionnaire (Submitted on 1/22/2024)  If you checked off any problems, how difficult have these problems made it for you to do your work, take care of things at home, or get along with other people?: Somewhat difficult  PHQ9 TOTAL SCORE: 6

## 2024-03-20 ENCOUNTER — TELEPHONE (OUTPATIENT)
Dept: FAMILY MEDICINE | Facility: CLINIC | Age: 24
End: 2024-03-20

## 2024-03-20 ENCOUNTER — E-VISIT (OUTPATIENT)
Dept: FAMILY MEDICINE | Facility: CLINIC | Age: 24
End: 2024-03-20
Payer: COMMERCIAL

## 2024-03-20 DIAGNOSIS — F90.2 ADHD (ATTENTION DEFICIT HYPERACTIVITY DISORDER), COMBINED TYPE: Primary | ICD-10-CM

## 2024-03-20 PROCEDURE — 99422 OL DIG E/M SVC 11-20 MIN: CPT | Performed by: PHYSICIAN ASSISTANT

## 2024-03-20 RX ORDER — LISDEXAMFETAMINE DIMESYLATE 30 MG/1
30 CAPSULE ORAL EVERY MORNING
Qty: 30 CAPSULE | Refills: 0 | Status: SHIPPED | OUTPATIENT
Start: 2024-03-20 | End: 2024-04-02

## 2024-03-20 NOTE — TELEPHONE ENCOUNTER
Patient had an evisit today.  Please call to schedule an in person visit with me in 1 month (ADHD/headaches).     Licha Cordova PA-C     Ear Star Wedge Flap Text: The defect edges were debeveled with a #15 blade scalpel.  Given the location of the defect and the proximity to free margins (helical rim) an ear star wedge flap was deemed most appropriate.  Using a sterile surgical marker, the appropriate flap was drawn incorporating the defect and placing the expected incisions between the helical rim and antihelix where possible.  The area thus outlined was incised through and through with a #15 scalpel blade.

## 2024-03-20 NOTE — TELEPHONE ENCOUNTER
Provider E-Visit time total (minutes): 11 minutes.    Message out to Dr. Narvaez about the neuropsych testing (referral placed, however she was referred back to Dr. Narvaez.

## 2024-03-23 ENCOUNTER — TRANSFERRED RECORDS (OUTPATIENT)
Dept: HEALTH INFORMATION MANAGEMENT | Facility: CLINIC | Age: 24
End: 2024-03-23
Payer: COMMERCIAL

## 2024-03-25 ENCOUNTER — MYC MEDICAL ADVICE (OUTPATIENT)
Dept: FAMILY MEDICINE | Facility: CLINIC | Age: 24
End: 2024-03-25
Payer: COMMERCIAL

## 2024-04-01 ENCOUNTER — MYC MEDICAL ADVICE (OUTPATIENT)
Dept: FAMILY MEDICINE | Facility: CLINIC | Age: 24
End: 2024-04-01
Payer: COMMERCIAL

## 2024-04-01 DIAGNOSIS — F90.2 ADHD (ATTENTION DEFICIT HYPERACTIVITY DISORDER), COMBINED TYPE: ICD-10-CM

## 2024-04-02 RX ORDER — LISDEXAMFETAMINE DIMESYLATE 30 MG/1
30 CAPSULE ORAL EVERY MORNING
Qty: 30 CAPSULE | Refills: 0 | Status: SHIPPED | OUTPATIENT
Start: 2024-04-02 | End: 2024-04-22

## 2024-04-12 DIAGNOSIS — F41.9 ANXIETY: ICD-10-CM

## 2024-04-16 ENCOUNTER — TELEPHONE (OUTPATIENT)
Dept: FAMILY MEDICINE | Facility: CLINIC | Age: 24
End: 2024-04-16

## 2024-04-16 NOTE — TELEPHONE ENCOUNTER
[employer] sent attached with questionnaire in relation to virtual visit on 04/17/24. Waiting for results.    Kirstin White MA on 4/16/2024 at 3:20 PM

## 2024-04-17 ENCOUNTER — VIRTUAL VISIT (OUTPATIENT)
Dept: FAMILY MEDICINE | Facility: CLINIC | Age: 24
End: 2024-04-17
Payer: COMMERCIAL

## 2024-04-17 DIAGNOSIS — F90.2 ADHD (ATTENTION DEFICIT HYPERACTIVITY DISORDER), COMBINED TYPE: Primary | ICD-10-CM

## 2024-04-17 DIAGNOSIS — Z87.820 HISTORY OF MULTIPLE CONCUSSIONS: ICD-10-CM

## 2024-04-17 PROCEDURE — 99213 OFFICE O/P EST LOW 20 MIN: CPT | Mod: 95 | Performed by: PHYSICIAN ASSISTANT

## 2024-04-17 PROCEDURE — G2211 COMPLEX E/M VISIT ADD ON: HCPCS | Mod: 95 | Performed by: PHYSICIAN ASSISTANT

## 2024-04-17 ASSESSMENT — PATIENT HEALTH QUESTIONNAIRE - PHQ9
SUM OF ALL RESPONSES TO PHQ QUESTIONS 1-9: 9
SUM OF ALL RESPONSES TO PHQ QUESTIONS 1-9: 9
10. IF YOU CHECKED OFF ANY PROBLEMS, HOW DIFFICULT HAVE THESE PROBLEMS MADE IT FOR YOU TO DO YOUR WORK, TAKE CARE OF THINGS AT HOME, OR GET ALONG WITH OTHER PEOPLE: SOMEWHAT DIFFICULT

## 2024-04-17 NOTE — PATIENT INSTRUCTIONS
Send me a mychart message if the sweaty hands/feet persist.  If so, I suggest cutting back on dose from 30 mg to 20 mg.
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
## 2024-04-21 ENCOUNTER — MYC MEDICAL ADVICE (OUTPATIENT)
Dept: FAMILY MEDICINE | Facility: CLINIC | Age: 24
End: 2024-04-21
Payer: COMMERCIAL

## 2024-04-21 DIAGNOSIS — F90.2 ADHD (ATTENTION DEFICIT HYPERACTIVITY DISORDER), COMBINED TYPE: ICD-10-CM

## 2024-04-22 RX ORDER — LISDEXAMFETAMINE DIMESYLATE 20 MG/1
20 CAPSULE ORAL EVERY MORNING
Qty: 30 CAPSULE | Refills: 0 | Status: SHIPPED | OUTPATIENT
Start: 2024-04-22 | End: 2024-04-23

## 2024-04-23 RX ORDER — LISDEXAMFETAMINE DIMESYLATE 20 MG/1
20 CAPSULE ORAL EVERY MORNING
Qty: 30 CAPSULE | Refills: 0 | Status: SHIPPED | OUTPATIENT
Start: 2024-04-23

## 2024-05-20 ASSESSMENT — ANXIETY QUESTIONNAIRES
4. TROUBLE RELAXING: MORE THAN HALF THE DAYS
8. IF YOU CHECKED OFF ANY PROBLEMS, HOW DIFFICULT HAVE THESE MADE IT FOR YOU TO DO YOUR WORK, TAKE CARE OF THINGS AT HOME, OR GET ALONG WITH OTHER PEOPLE?: SOMEWHAT DIFFICULT
3. WORRYING TOO MUCH ABOUT DIFFERENT THINGS: SEVERAL DAYS
7. FEELING AFRAID AS IF SOMETHING AWFUL MIGHT HAPPEN: NOT AT ALL
GAD7 TOTAL SCORE: 6
1. FEELING NERVOUS, ANXIOUS, OR ON EDGE: SEVERAL DAYS
5. BEING SO RESTLESS THAT IT IS HARD TO SIT STILL: SEVERAL DAYS
2. NOT BEING ABLE TO STOP OR CONTROL WORRYING: NOT AT ALL
6. BECOMING EASILY ANNOYED OR IRRITABLE: SEVERAL DAYS
IF YOU CHECKED OFF ANY PROBLEMS ON THIS QUESTIONNAIRE, HOW DIFFICULT HAVE THESE PROBLEMS MADE IT FOR YOU TO DO YOUR WORK, TAKE CARE OF THINGS AT HOME, OR GET ALONG WITH OTHER PEOPLE: SOMEWHAT DIFFICULT
GAD7 TOTAL SCORE: 6
7. FEELING AFRAID AS IF SOMETHING AWFUL MIGHT HAPPEN: NOT AT ALL

## 2024-05-22 ENCOUNTER — TELEPHONE (OUTPATIENT)
Dept: FAMILY MEDICINE | Facility: CLINIC | Age: 24
End: 2024-05-22

## 2024-05-22 ENCOUNTER — VIRTUAL VISIT (OUTPATIENT)
Dept: FAMILY MEDICINE | Facility: CLINIC | Age: 24
End: 2024-05-22
Payer: COMMERCIAL

## 2024-05-22 DIAGNOSIS — F33.0 MILD RECURRENT MAJOR DEPRESSION (H): ICD-10-CM

## 2024-05-22 DIAGNOSIS — F90.2 ADHD (ATTENTION DEFICIT HYPERACTIVITY DISORDER), COMBINED TYPE: Primary | ICD-10-CM

## 2024-05-22 DIAGNOSIS — F41.9 ANXIETY: ICD-10-CM

## 2024-05-22 PROCEDURE — 96127 BRIEF EMOTIONAL/BEHAV ASSMT: CPT | Mod: 95 | Performed by: PHYSICIAN ASSISTANT

## 2024-05-22 PROCEDURE — 99213 OFFICE O/P EST LOW 20 MIN: CPT | Mod: 95 | Performed by: PHYSICIAN ASSISTANT

## 2024-05-22 NOTE — PROGRESS NOTES
Dustin is a 23 year old who is being evaluated via a billable video visit.    How would you like to obtain your AVS? MyChart  If the video visit is dropped, the invitation should be resent by: Text to cell phone: [phone]  Will anyone else be joining your video visit? No      Assessment & Plan     ADHD (attention deficit hyperactivity disorder), combined type  Tolerating vyvanse better now.  Will leave as is.     Anxiety  Let's taper off zoloft by taking 1/2 tab daily x 14 days then stop.      Mild recurrent major depression (H24)    Let's taper off zoloft by taking 1/2 tab daily x 14 days then stop.            Subjective   Dustin is a 23 year old, presenting for the following health issues:  No chief complaint on file.        5/22/2024    10:05 AM   Additional Questions   Roomed by Kirstin   Accompanied by Self         5/22/2024    10:05 AM   Patient Reported Additional Medications   Patient reports taking the following new medications na       Patient with history of Anxiety and Depression arrived to discuss stopping Sertraline due to improving symptoms. GAD7 and PHQ9 completed online.     Has noticed more side effects with mixing zoloft with Vyvanse.  She feels flattening and numbing of affect.      Sweating and itching has improved and nearly stopped.     History of Present Illness       Mental Health Follow-up:  Patient presents to follow-up on Depression & Anxiety.Patient's depression since last visit has been:  Medium  The patient is not having other symptoms associated with depression.  Patient's anxiety since last visit has been:  Medium  The patient is not having other symptoms associated with anxiety.  Any significant life events: No  Patient is feeling anxious or having panic attacks.  Patient has no concerns about alcohol or drug use.    She eats 2-3 servings of fruits and vegetables daily.She consumes 4 sweetened beverage(s) daily.She exercises with enough effort to increase her heart rate 9 or  less minutes per day.  She exercises with enough effort to increase her heart rate 4 days per week. She is missing 4 dose(s) of medications per week.  She is not taking prescribed medications regularly due to side effects.     Social History     Tobacco Use    Smoking status: Never    Smokeless tobacco: Never   Vaping Use    Vaping status: Never Used   Substance Use Topics    Alcohol use: Yes     Comment: social    Drug use: Never         1/7/2024    10:40 AM 1/22/2024     9:46 AM 4/17/2024    10:06 AM   PHQ   PHQ-9 Total Score 7 6 9   Q9: Thoughts of better off dead/self-harm past 2 weeks Not at all Not at all Not at all         4/27/2023    11:06 AM 10/16/2023     4:48 PM 5/20/2024    10:34 AM   FLORENTINO-7 SCORE   Total Score  13 (moderate anxiety) 6 (mild anxiety)   Total Score 12 13 6         4/17/2024    10:06 AM   Last PHQ-9   1.  Little interest or pleasure in doing things 1   2.  Feeling down, depressed, or hopeless 1   3.  Trouble falling or staying asleep, or sleeping too much 2   4.  Feeling tired or having little energy 1   5.  Poor appetite or overeating 1   6.  Feeling bad about yourself 0   7.  Trouble concentrating 2   8.  Moving slowly or restless 1   Q9: Thoughts of better off dead/self-harm past 2 weeks 0   PHQ-9 Total Score 9         5/20/2024    10:34 AM   FLORENTINO-7    1. Feeling nervous, anxious, or on edge 1   2. Not being able to stop or control worrying 0   3. Worrying too much about different things 1   4. Trouble relaxing 2   5. Being so restless that it is hard to sit still 1   6. Becoming easily annoyed or irritable 1   7. Feeling afraid, as if something awful might happen 0   FLORENTINO-7 Total Score 6   If you checked any problems, how difficult have they made it for you to do your work, take care of things at home, or get along with other people? Somewhat difficult       Suicide Assessment Five-step Evaluation and Treatment (SAFE-T)            Review of Systems  Constitutional, HEENT, cardiovascular,  pulmonary, gi and gu systems are negative, except as otherwise noted.      Objective           Vitals:  No vitals were obtained today due to virtual visit.    Physical Exam   GENERAL: alert and no distress  EYES: Eyes grossly normal to inspection.  No discharge or erythema, or obvious scleral/conjunctival abnormalities.  RESP: No audible wheeze, cough, or visible cyanosis.    SKIN: Visible skin clear. No significant rash, abnormal pigmentation or lesions.  NEURO: Cranial nerves grossly intact.  Mentation and speech appropriate for age.  PSYCH: Appropriate affect, tone, and pace of words          Video-Visit Details    Type of service:  Video Visit   Originating Location (pt. Location): Home    Distant Location (provider location):  On-site  Platform used for Video Visit: Siri  Signed Electronically by: Licha Cordova PA-C

## 2024-05-22 NOTE — TELEPHONE ENCOUNTER
[employer] sent attached with questionnaire related to virtual visit with PCP on 05/22/24. Waiting for results.     Kirstin White MA on 5/22/2024 at 6:41 AM

## 2024-07-11 DIAGNOSIS — F41.9 ANXIETY: ICD-10-CM

## 2024-07-13 ENCOUNTER — HEALTH MAINTENANCE LETTER (OUTPATIENT)
Age: 24
End: 2024-07-13

## 2024-07-19 ENCOUNTER — OFFICE VISIT (OUTPATIENT)
Dept: URGENT CARE | Facility: URGENT CARE | Age: 24
End: 2024-07-19
Payer: COMMERCIAL

## 2024-07-19 VITALS
DIASTOLIC BLOOD PRESSURE: 85 MMHG | OXYGEN SATURATION: 98 % | TEMPERATURE: 98.1 F | SYSTOLIC BLOOD PRESSURE: 133 MMHG | RESPIRATION RATE: 18 BRPM | WEIGHT: 181 LBS | BODY MASS INDEX: 27.91 KG/M2 | HEART RATE: 85 BPM

## 2024-07-19 DIAGNOSIS — K62.5 BRBPR (BRIGHT RED BLOOD PER RECTUM): Primary | ICD-10-CM

## 2024-07-19 DIAGNOSIS — R10.32 ABDOMINAL PAIN, LEFT LOWER QUADRANT: ICD-10-CM

## 2024-07-19 LAB
ERYTHROCYTE [DISTWIDTH] IN BLOOD BY AUTOMATED COUNT: 12.5 % (ref 10–15)
HCT VFR BLD AUTO: 43.6 % (ref 35–47)
HGB BLD-MCNC: 14.2 G/DL (ref 11.7–15.7)
HOLD SPECIMEN: NORMAL
MCH RBC QN AUTO: 28.8 PG (ref 26.5–33)
MCHC RBC AUTO-ENTMCNC: 32.6 G/DL (ref 31.5–36.5)
MCV RBC AUTO: 88 FL (ref 78–100)
PLATELET # BLD AUTO: 292 10E3/UL (ref 150–450)
RBC # BLD AUTO: 4.93 10E6/UL (ref 3.8–5.2)
WBC # BLD AUTO: 5 10E3/UL (ref 4–11)

## 2024-07-19 PROCEDURE — 36415 COLL VENOUS BLD VENIPUNCTURE: CPT | Performed by: STUDENT IN AN ORGANIZED HEALTH CARE EDUCATION/TRAINING PROGRAM

## 2024-07-19 PROCEDURE — 87507 IADNA-DNA/RNA PROBE TQ 12-25: CPT | Performed by: STUDENT IN AN ORGANIZED HEALTH CARE EDUCATION/TRAINING PROGRAM

## 2024-07-19 PROCEDURE — 85027 COMPLETE CBC AUTOMATED: CPT | Performed by: STUDENT IN AN ORGANIZED HEALTH CARE EDUCATION/TRAINING PROGRAM

## 2024-07-19 PROCEDURE — 99214 OFFICE O/P EST MOD 30 MIN: CPT | Performed by: STUDENT IN AN ORGANIZED HEALTH CARE EDUCATION/TRAINING PROGRAM

## 2024-07-19 NOTE — PATIENT INSTRUCTIONS
Please return the stool sample. If it is negative for bacteria, I have placed a GI referral for an appt and possible colonoscopy    If you have increased fatigue, any syncopal episodes, stops passing gas, or is unable to keep liquids or food down, go to the ED.

## 2024-07-19 NOTE — PROGRESS NOTES
Assessment & Plan     BRBPR (bright red blood per rectum)  Abdominal pain, left lower quadrant    - CBC with Platelets and Reflex to Iron Studies  - Enteric Bacteria and Virus Panel by [facility] Stool  - Adult GI  Referral - Consult Only    10 days of nausea, lower abdominal pain (worse after defecating), and BRBPR in a 23yr old female with a family h/o colon cancer. She is vitally stable and well appearing with no internal or external hemorrhoids, anal fissures, or abscesses on exam. Hgb  14.2 which is reassuring against significant blood loss. She has no diarrhea or vomiting but will send stool sample home to assess for bacterial cause of bleeding. If negative, since no source of bleeding was found, she should have further evaluation and likely a colonoscopy. Referral sent to [facility] and Dustin was aware that if she has increased fatigue, any syncopal episodes, stops passing gas, or is unable to keep liquids or food down, she should go to the ED.    Return for In clinic with your primary care provider.    Esha Medellin, DO  she/her  [facility]    Subjective     Dustin YIP Laura is a 23 year old female who presents to clinic today for the following health issues:    HPI  Per chart, was seen in ED 3/23/24 for n/v d/t alcohol    Abdominal Pain  7/9-7/10  Abdominal pain low band across her lower abdomen, nausea intermittent (once really bad on 7/11), headaches (intermittent but also deals with migraines)  Daily bloody stools, bright red mixed in with stool when she wipes (1-3x/day)  First time she's had it for this consecutive  LMP 7/15  No h/o IBD  Radiation: None.    Pain character: sharp and cramping,   Duration: worse right after stooling and lasting about 10m  No pain around the anua  Exacerbated by: bowel movement  Relieved by: pain meds  No change in pain with positional changes  no anorexia, dysuria, and hematuria.  Still passing gas  Female : no chance of pregnancy, not sexually  "active  Surgical History: none    Two paternal uncles with colon cancer, maternal grandfather has had \"huge colon issues\"    No past medical history on file.    No Known Allergies  Current Outpatient Medications   Medication Sig Dispense Refill    lisdexamfetamine (VYVANSE) 20 MG capsule Take 1 capsule (20 mg) by mouth every morning 30 capsule 0     No current facility-administered medications for this visit.      Review of Systems  Constitutional, HEENT, cardiovascular, pulmonary, gi and gu systems are negative, except as otherwise noted.      Objective    /85   Pulse 85   Temp 98.1  F (36.7  C) (Tympanic)   Resp 18   Wt 82.1 kg (181 lb)   LMP 07/09/2024   SpO2 98%   BMI 27.91 kg/m      Physical Exam  Vitals reviewed.   Constitutional:       Appearance: Normal appearance.   HENT:      Mouth/Throat:      Mouth: Mucous membranes are moist.   Eyes:      Pupils: Pupils are equal, round, and reactive to light.   Cardiovascular:      Rate and Rhythm: Normal rate.   Pulmonary:      Effort: Pulmonary effort is normal.   Abdominal:      General: Abdomen is flat.      Palpations: Abdomen is soft. There is no fluid wave.      Tenderness: There is abdominal tenderness in the right lower quadrant, suprapubic area and left lower quadrant. There is no right CVA tenderness, left CVA tenderness, guarding or rebound.   Genitourinary:     Comments: Declined chaperone  No internal or external hemorrhoids, anal fissures, abscesses noted  Skin:     Coloration: Skin is not pale.   Neurological:      Mental Status: She is alert and oriented to person, place, and time.   Psychiatric:         Mood and Affect: Mood normal.         Behavior: Behavior normal.        Results for orders placed or performed in visit on 07/19/24   CBC with Platelets and Reflex to Iron Studies     Status: Normal   Result Value Ref Range    WBC Count 5.0 4.0 - 11.0 10e3/uL    RBC Count 4.93 3.80 - 5.20 10e6/uL    Hemoglobin 14.2 11.7 - 15.7 g/dL    " Hematocrit 43.6 35.0 - 47.0 %    MCV 88 78 - 100 fL    MCH 28.8 26.5 - 33.0 pg    MCHC 32.6 31.5 - 36.5 g/dL    RDW 12.5 10.0 - 15.0 %    Platelet Count 292 150 - 450 10e3/uL   CBC with Platelets and Reflex to Iron Studies     Status: None (In process)    Narrative    The following orders were created for panel order CBC with Platelets and Reflex to Iron Studies.  Procedure                               Abnormality         Status                     ---------                               -----------         ------                     CBC with Platelets and R...[[id number]]  Normal              Final result               Extra Green Top (Lithium...[[id number]]                      In process                   Please view results for these tests on the individual orders.           The use of Dragon/Digital Shadowsation services may have been used to construct the content in this note; any grammatical or spelling errors are non-intentional. Please contact the author of this note directly if you are in need of any clarification.

## 2024-07-20 LAB
ADV 40+41 DNA STL QL NAA+NON-PROBE: NEGATIVE
ASTRO TYP 1-8 RNA STL QL NAA+NON-PROBE: NEGATIVE
C CAYETANENSIS DNA STL QL NAA+NON-PROBE: NEGATIVE
CAMPYLOBACTER DNA SPEC NAA+PROBE: NEGATIVE
CRYPTOSP DNA STL QL NAA+NON-PROBE: NEGATIVE
E COLI O157 DNA STL QL NAA+NON-PROBE: NORMAL
E HISTOLYT DNA STL QL NAA+NON-PROBE: NEGATIVE
EAEC ASTA GENE ISLT QL NAA+PROBE: NEGATIVE
EC STX1+STX2 GENES STL QL NAA+NON-PROBE: NEGATIVE
EPEC EAE GENE STL QL NAA+NON-PROBE: NEGATIVE
ETEC LTA+ST1A+ST1B TOX ST NAA+NON-PROBE: NEGATIVE
G LAMBLIA DNA STL QL NAA+NON-PROBE: NEGATIVE
NOROVIRUS GI+II RNA STL QL NAA+NON-PROBE: NEGATIVE
P SHIGELLOIDES DNA STL QL NAA+NON-PROBE: NEGATIVE
RVA RNA STL QL NAA+NON-PROBE: NEGATIVE
SALMONELLA SP RPOD STL QL NAA+PROBE: NEGATIVE
SAPO I+II+IV+V RNA STL QL NAA+NON-PROBE: NEGATIVE
SHIGELLA SP+EIEC IPAH ST NAA+NON-PROBE: NEGATIVE
V CHOLERAE DNA SPEC QL NAA+PROBE: NEGATIVE
VIBRIO DNA SPEC NAA+PROBE: NEGATIVE
Y ENTEROCOL DNA STL QL NAA+PROBE: NEGATIVE

## 2024-11-07 ASSESSMENT — PATIENT HEALTH QUESTIONNAIRE - PHQ9: SUM OF ALL RESPONSES TO PHQ QUESTIONS 1-9: 13

## 2024-11-13 ENCOUNTER — OFFICE VISIT (OUTPATIENT)
Dept: URGENT CARE | Facility: URGENT CARE | Age: 24
End: 2024-11-13
Payer: COMMERCIAL

## 2024-11-13 VITALS
HEART RATE: 84 BPM | WEIGHT: 189.6 LBS | TEMPERATURE: 98.2 F | BODY MASS INDEX: 29.24 KG/M2 | OXYGEN SATURATION: 98 % | DIASTOLIC BLOOD PRESSURE: 83 MMHG | SYSTOLIC BLOOD PRESSURE: 121 MMHG | RESPIRATION RATE: 17 BRPM

## 2024-11-13 DIAGNOSIS — M79.661 PAIN IN RIGHT LOWER LEG: Primary | ICD-10-CM

## 2024-11-13 PROCEDURE — 99213 OFFICE O/P EST LOW 20 MIN: CPT | Performed by: PHYSICIAN ASSISTANT

## 2024-11-13 RX ORDER — DICLOFENAC SODIUM 75 MG/1
75 TABLET, DELAYED RELEASE ORAL 2 TIMES DAILY
Qty: 20 TABLET | Refills: 0 | Status: CANCELLED | OUTPATIENT
Start: 2024-11-13 | End: 2024-11-23

## 2024-11-13 NOTE — PATIENT INSTRUCTIONS
Rest-activity as tolerated  Ibuprofen 800mg twice daily for 7-10 days  Ice your injury for 10 to 15 minutes 3-4 times a day  Wear a heel cup in both shoes  Wear shoes at all times, even in your house  Elevate, above the level of your heart, whenever possible  See primary care provider if symptoms worsen or fail to improve

## 2024-11-13 NOTE — PROGRESS NOTES
Assessment & Plan     Pain in right lower leg    Wells criteria with low risk for DVT. No recent travel or surgery, no swelling or pitting edema and bilateral symptoms with worsening or right leg pain. Suspect achilles tendonitis. Ice, heel cups and diclofenac.     Return in about 1 week (around 11/20/2024) for visit with primary care provider if not improving.     HAL[facility] Carrero [facility]    Subjective   Dustin Sanchez is a 24 year old who presents for the following health issues     Patient presents with:  Leg Pain: Right leg pain for a couple weeks now, worse the last couple days. No injury recalled. Pt wakes up in the middle of the night with excruciating pain in calve and foot.       MEI    Dsutin presents clinic today for evaluation of pain in bilateral lower legs.  Symptoms first began about 1 month ago, no injury recalled.  Pain in right leg has gotten significantly worse, especially at night over the last couple days. No excessive heat, erythema, swelling. No recent travel, no surgery. No history of bl correcteeding or clotting disorders.    Review of Systems   ROS negative except as stated above.      Objective    /83 (BP Location: Left arm, Cuff Size: Adult Regular)   Pulse 84   Temp 98.2  F (36.8  C) (Tympanic)   Resp 17   Wt 86 kg (189 lb 9.6 oz)   SpO2 98%   BMI 29.24 kg/m    Physical Exam   GENERAL: alert and no distress  MS: right lower leg painful to palpation, especially over ache lies tendon and lower medial calf. No erythema, edema, excess warmth. No abnormal masses to palpation.    No results found for any visits on 11/13/24.

## 2025-02-12 ENCOUNTER — TELEPHONE (OUTPATIENT)
Dept: FAMILY MEDICINE | Facility: CLINIC | Age: 25
End: 2025-02-12
Payer: COMMERCIAL

## 2025-02-12 NOTE — TELEPHONE ENCOUNTER
Patient Quality Outreach    Patient is due for the following:   Physical Preventive Adult Physical    Action(s) Taken:   Schedule a Adult Preventative    Type of outreach:    Sent [employer] message.    Questions for provider review:    None           Kirstin White MA  Chart routed to Care Team.

## 2025-02-26 ENCOUNTER — VIRTUAL VISIT (OUTPATIENT)
Dept: FAMILY MEDICINE | Facility: CLINIC | Age: 25
End: 2025-02-26
Payer: COMMERCIAL

## 2025-02-26 DIAGNOSIS — Z02.6 ENCOUNTER RELATED TO WORKER'S COMPENSATION CLAIM: ICD-10-CM

## 2025-02-26 DIAGNOSIS — S06.0X0S CONCUSSION WITHOUT LOSS OF CONSCIOUSNESS, SEQUELA: Primary | ICD-10-CM

## 2025-02-26 DIAGNOSIS — Z02.9 ENCOUNTERS FOR ADMINISTRATIVE PURPOSES: ICD-10-CM

## 2025-02-26 PROCEDURE — 98005 SYNCH AUDIO-VIDEO EST LOW 20: CPT | Performed by: PHYSICIAN ASSISTANT

## 2025-02-26 ASSESSMENT — PATIENT HEALTH QUESTIONNAIRE - PHQ9
10. IF YOU CHECKED OFF ANY PROBLEMS, HOW DIFFICULT HAVE THESE PROBLEMS MADE IT FOR YOU TO DO YOUR WORK, TAKE CARE OF THINGS AT HOME, OR GET ALONG WITH OTHER PEOPLE: VERY DIFFICULT
SUM OF ALL RESPONSES TO PHQ QUESTIONS 1-9: 14
SUM OF ALL RESPONSES TO PHQ QUESTIONS 1-9: 14
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
## 2025-02-26 NOTE — LETTER
[facility]  [address] [place] [address]  Phone: [phone]    February 26, 2025        Dustin Sanchez  [address] [place] [address]          To whom it may concern:    RE: Dustin Sanchez    Patient was seen in follow-up to an injury that occurred at work on 2/18/25 that resulted in a concussion.      Patient may return to work 2/27/25 and participate in training from 8:30 am - 4 pm.   I suggest the following light duty restrictions from 3/3/25 -3/8/25:  -4 hour maximum shift   - allow for breaks if needed.   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     Please contact me for questions or concerns.      Sincerely,      Licha Cordova PA-C

## 2025-03-10 ENCOUNTER — MYC MEDICAL ADVICE (OUTPATIENT)
Dept: FAMILY MEDICINE | Facility: CLINIC | Age: 25
End: 2025-03-10

## 2025-03-10 ENCOUNTER — VIRTUAL VISIT (OUTPATIENT)
Dept: FAMILY MEDICINE | Facility: CLINIC | Age: 25
End: 2025-03-10
Payer: OTHER MISCELLANEOUS

## 2025-03-10 DIAGNOSIS — G43.001 MIGRAINE WITHOUT AURA AND WITH STATUS MIGRAINOSUS, NOT INTRACTABLE: ICD-10-CM

## 2025-03-10 DIAGNOSIS — S06.0X0S CONCUSSION WITHOUT LOSS OF CONSCIOUSNESS, SEQUELA: Primary | ICD-10-CM

## 2025-03-10 PROCEDURE — 98005 SYNCH AUDIO-VIDEO EST LOW 20: CPT | Performed by: PHYSICIAN ASSISTANT

## 2025-03-10 RX ORDER — SUMATRIPTAN SUCCINATE 25 MG/1
25-50 TABLET ORAL
Qty: 20 TABLET | Refills: 3 | Status: SHIPPED | OUTPATIENT
Start: 2025-03-10

## 2025-03-10 RX ORDER — GABAPENTIN 100 MG/1
CAPSULE ORAL
Qty: 90 CAPSULE | Refills: 0 | Status: CANCELLED | OUTPATIENT
Start: 2025-03-10 | End: 2025-04-07

## 2025-03-10 RX ORDER — GABAPENTIN 100 MG/1
CAPSULE ORAL
Qty: 78 CAPSULE | Refills: 0 | Status: SHIPPED | OUTPATIENT
Start: 2025-03-10 | End: 2025-04-07

## 2025-03-10 NOTE — LETTER
3/10/2025    Dustin Sanchez   2000        To Whom it May Concern;    Patient was seen in follow-up to an injury that occurred at work on 25 that resulted in a concussion.       I suggest the following light duty restrictions from 3/10/25 -3/24/25:  -6 hour maximum shift   - allow for breaks if needed.   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      Please contact me for questions or concerns.    Licha Cordova PA-C

## 2025-03-10 NOTE — TELEPHONE ENCOUNTER
OV notes from today 3/10/25    Concussion without loss of consciousness, sequela  Still with headaches, daily.  I recommend trying a medication such as gabapentin to help with headaches.   Discussed risk of analgesic rebound headaches (as she is using a lot of tylenol/ibuprofen).   She would like to discuss further with her Mom and will mychart message me if she would like to start gabapentin.  Discussed we'd start with 100 mg at bedtime and increase to 200 mg and then 300 mg qs if tolerated.  Reviewed side effects.  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       Please advise.     Shanita Davey RN on 3/10/2025 at 3:11 PM

## 2025-03-10 NOTE — PROGRESS NOTES
Dustin is a 24 year old who is being evaluated via a billable video visit.    How would you like to obtain your AVS? Jessica  If the video visit is dropped, the invitation should be resent by: Text to cell phone: [phone]  Will anyone else be joining your video visit? No      Assessment & Plan     Concussion without loss of consciousness, sequela  Still with headaches, daily.  I recommend trying a medication such as gabapentin to help with headaches.   Discussed risk of analgesic rebound headaches (as she is using a lot of tylenol/ibuprofen).   She would like to discuss further with her Mom and will gisellehart message me if she would like to start gabapentin.  Discussed we'd start with 100 mg at bedtime and increase to 200 mg and then 300 mg qs if tolerated.  Reviewed side effects.  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     Will extend hours on shifts from 4 to 6 hours and will continue to avoid contact with inmates.    Recheck in 2 weeks.     Migraine without aura and with status migrainosus, not intractable  Stable, stress can trigger these.  Refilled.   - SUMAtriptan (IMITREX) 25 MG tablet; Take 1-2 tablets (25-50 mg) by mouth at onset of headache for migraine. May repeat in 2 hours. Max 8 tablets/24 hours.                Subjective   Dustin is a 24 year old, presenting for the following health issues:  Head Injury (Follow-up )        3/10/2025     9:23 AM   Additional Questions   Roomed by Kirstin   Accompanied by Self check in     HPI      Patient with history of Concussion arrived for Follow-up.     Please select a reason for your visit: Injury   What injury are you coming in for today? Other   How many days per week do you miss taking your medication? 7   What makes it hard for you to take your medication every day? remembering to take    other   What is the reason for your visit today? Follow up of a concussion    When did your symptoms begin? 1-2 weeks ago   What are your symptoms? headaches are the main symptom that is staying, the others are gone   How would you describe these symptoms? Moderate   Are your symptoms: Staying the same   Have you had these symptoms before? Yes   Have you tried or received treatment for these symptoms before? Yes   Did that treatment work?  Yes   Please describe the treatment you had: Tylenol and basic concussion treatment   Is there anything that makes you feel worse? haven't found a certain trigger for the headaches, they are scattered   Is there anything that makes you feel better? tylenol and sleep     Still having headaches. This past week has been more stressful, so headaches have increased.  She did have a migraine last week (not concussion).  Uses imitrex and that typically helps (needs a refill)    She has been working a reduced work schedule (4 hours), which has been going well.   Still using a night screen, this does help.     She was referred to the concussion clinic, but prefers to hold off on this and would like to do a recheck in 2 weeks with me instead.             Review of Systems  Constitutional, neuro, ENT, endocrine, pulmonary, cardiac, gastrointestinal, genitourinary, musculoskeletal, integument and psychiatric systems are negative, except as otherwise noted.      Objective           Vitals:  No vitals were obtained today due to virtual visit.    Physical Exam   GENERAL: alert and no distress  EYES: Eyes grossly normal to inspection.  No discharge or erythema, or obvious scleral/conjunctival abnormalities.  RESP: No audible wheeze, cough, or visible cyanosis.    SKIN: Visible skin clear. No significant rash, abnormal pigmentation or lesions.  NEURO: Cranial nerves grossly intact.  Mentation and speech appropriate for age.  PSYCH: Appropriate affect, tone, and pace of words          Video-Visit Details    Type of service:  Video Visit   Originating Location (pt.  Location): Home    Distant Location (provider location):  On-site  Platform used for Video Visit: Siri  Signed Electronically by: Licha Cordova PA-C

## 2025-03-24 ENCOUNTER — VIRTUAL VISIT (OUTPATIENT)
Dept: FAMILY MEDICINE | Facility: CLINIC | Age: 25
End: 2025-03-24
Payer: COMMERCIAL

## 2025-03-24 DIAGNOSIS — S06.0X0S CONCUSSION WITHOUT LOSS OF CONSCIOUSNESS, SEQUELA: Primary | ICD-10-CM

## 2025-03-24 DIAGNOSIS — Z02.6 ENCOUNTER RELATED TO WORKER'S COMPENSATION CLAIM: ICD-10-CM

## 2025-03-24 PROCEDURE — 98004 SYNCH AUDIO-VIDEO EST SF 10: CPT | Performed by: PHYSICIAN ASSISTANT

## 2025-03-24 NOTE — PROGRESS NOTES
"Dustin is a 24 year old who is being evaluated via a billable video visit.    How would you like to obtain your AVS? MyChart  If the video visit is dropped, the invitation should be resent by: Text to cell phone: [phone]  Will anyone else be joining your video visit? No      Assessment & Plan     Concussion without loss of consciousness, sequela  Headaches resolved. Return to work without restrictions 3/25/25.  I do recommend breaks PRN for headaches.      Encounter related to worker's compensation claim  Will close claim.                 Subjective   Dustin is a 24 year old, presenting for the following health issues:  Head Injury (Follow-up )        3/24/2025    12:15 PM   Additional Questions   Roomed by Kirstin   Accompanied by Self check in       Patient with Concussion and Headache history arrived for follow-up and Work Restrictions.     History of Present Illness       Headaches:   Since the patient's last clinic visit, headaches are: no change  The patient is getting headaches:  Consistent for the days but getting better  She is able to do normal daily activities when she has a migraine.  The patient is taking the following rescue/relief medications:  Ibuprofen (Advil, Motrin), Tylenol and sumatriptan (Imitrex)   Patient states \"The relief is inconsistent\" from the rescue/relief medications.   The patient is taking the following medications to prevent migraines:  No medications to prevent migraines  In the past 4 weeks, the patient has gone to an Urgent Care or Emergency Room 0 times times due to headaches.    She eats 2-3 servings of fruits and vegetables daily.She consumes 4 sweetened beverage(s) daily.She exercises with enough effort to increase her heart rate 20 to 29 minutes per day.  She exercises with enough effort to increase her heart rate 5 days per week.         Gabapentin worsened the headache (was one of her worst migraines ever).     Headaches are improving.  Had a headache on Saturday " (but she thinks that was related to lack of sleep).    Other than that, headaches have disappeared.    No longer taking tylenol before shifts.   Has only taken tylenol 2x since her last visit.     She is weaning off the blue light glasses.  She has been working 6 hour shifts and this has been manageable.  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               Review of Systems  Constitutional, HEENT, cardiovascular, pulmonary, gi and gu systems are negative, except as otherwise noted.      Objective           Vitals:  No vitals were obtained today due to virtual visit.    Physical Exam   GENERAL: alert and no distress  EYES: Eyes grossly normal to inspection.  No discharge or erythema, or obvious scleral/conjunctival abnormalities.  RESP: No audible wheeze, cough, or visible cyanosis.    SKIN: Visible skin clear. No significant rash, abnormal pigmentation or lesions.  NEURO: Cranial nerves grossly intact.  Mentation and speech appropriate for age.  PSYCH: Appropriate affect, tone, and pace of words          Video-Visit Details    Type of service:  Video Visit   Originating Location (pt. Location): Home    Distant Location (provider location):  On-site  Platform used for Video Visit: Siri  Signed Electronically by: Licha Cordova PA-C

## 2025-03-24 NOTE — LETTER
March 24, 2025      Dustin Sanchez  [address] [place] [id number]        To Whom It May Concern:    Dustin Sanchez  was seen on 3/24/25 for a follow-up on her concussion.  She may return to work without restrictions on 3/25/25.     Please allow her breaks if needed for headaches to avoid setting her back medically.         Sincerely,        Licha Cordova PA-C    Electronically signed